# Patient Record
Sex: FEMALE | Race: WHITE
[De-identification: names, ages, dates, MRNs, and addresses within clinical notes are randomized per-mention and may not be internally consistent; named-entity substitution may affect disease eponyms.]

---

## 2017-02-03 ENCOUNTER — HOSPITAL ENCOUNTER (OUTPATIENT)
Dept: HOSPITAL 25 - OR | Age: 67
Discharge: HOME | End: 2017-02-03
Attending: ORTHOPAEDIC SURGERY
Payer: MEDICARE

## 2017-02-03 VITALS — DIASTOLIC BLOOD PRESSURE: 83 MMHG | SYSTOLIC BLOOD PRESSURE: 140 MMHG

## 2017-02-03 DIAGNOSIS — M75.42: Primary | ICD-10-CM

## 2017-02-03 DIAGNOSIS — M19.012: ICD-10-CM

## 2017-02-03 DIAGNOSIS — M75.102: ICD-10-CM

## 2017-02-03 DIAGNOSIS — M75.02: ICD-10-CM

## 2017-02-03 DIAGNOSIS — I10: ICD-10-CM

## 2017-02-03 RX ADMIN — FENTANYL CITRATE PRN MCG: 0.05 INJECTION, SOLUTION INTRAMUSCULAR; INTRAVENOUS at 11:28

## 2017-02-03 RX ADMIN — FENTANYL CITRATE PRN MCG: 0.05 INJECTION, SOLUTION INTRAMUSCULAR; INTRAVENOUS at 10:50

## 2017-02-05 NOTE — OP
OPERATIVE REPORT:

 

DATE OF OPERATION:  02/03/17

 

DATE OF BIRTH:  [birth year]

 

SURGEON:  Jono Conner MD

 

ASSISTANT:  MISSY Longo

 

ANESTHESIOLOGIST:  Dr. Kaba.

 

ANESTHESIA:  General anesthesia, regional anesthesia.

 

PRE-OP DIAGNOSES:

1.  Left shoulder partial thickness rotator cuff tear, supraspinatus.

2.  Left shoulder possible biceps, possible superior labral tear.

3.  Left shoulder subacromial impingement.

4.  Left shoulder AC joint arthritis.

5.  Left shoulder capsulitis, stiffness/reduced range of motion.

6. Left shoulder os acromiale

 

POST-OP DIAGNOSES:

1.  Left shoulder partial thickness rotator cuff tear, supraspinatus.

2.  Left shoulder subacromial impingement.

3.  Left shoulder AC joint arthritis.

4.  Left shoulder capsulitis, reduced range of motion.

5.  No left shoulder biceps tear.

6.  Positive left shoulder unstable superior labral tear.

7.  Left shoulder os acromiale

 

OPERATIVE PROCEDURE:

1.  Left shoulder examination under anesthesia.

2.  Left shoulder manipulation under anesthesia.

3.  Left shoulder arthroscopic debridement, extensive, including of the 
superior labrum, rotator cuff interval, and subacromial bursa.

4.  Left shoulder arthroscopic lysis of adhesions, rotator interval.

5.  Left shoulder arthroscopic release of biceps tendon

6.  Left shoulder arthroscopic subacromial decompression.

7.  Left shoulder arthroscopic distal clavicle resection.

 

ANTIBIOSIS:  2 g Ancef IV.

 

ESTIMATED BLOOD LOSS:  Minimal.

 

COMPLICATIONS:  None.

 

SPECIMEN:  None.

 

IMPLANTS:  None.

 

INDICATIONS FOR SURGERY:  The patient is a 66-year-old woman, right hand 
dominant, golfer and former , and currently a coordinator of a 
food pantry in [place], who presented to me in clinic with pain in the left 
shoulder from September 2016.  It affected her activities of daily living as 
well as sleep.  The patient was treated, insufficiently, with 12 full weeks of 
physical therapy, NSAIDs including Aleve, ibuprofen, and Celebrex as well as 
Tylenol, and a subacromial cortisone injection.  On the patient's exam, she 
consistently had subacromial impingement signs, pain and/or weakness with 
rotator cuff stress testing.  The patient sometimes had tenderness to palpation 
of the proximal biceps.  The patient had more recent clinic visits and 
demonstrated decreased forward flexion even after I had allowed some time in 
clinic to try to enhance this passive range of motion slowly and softly.  X-
rays demonstrated an os acromiale as well as AC joint, significant narrowing 
and increased anterior hook of the acromion.  MRI demonstrated partial 
thickness articular-sided rotator cuff tear of the anterior most supraspinatus 
approximately 50%, chronic, with no increased signal of that tear.  It also 
showed a [place] complex and os acromiale.  The patient opted for surgical 
management.

 

DESCRIPTION OF PROCEDURE:  Preoperative written consent.  Operative extremity 
was marked in the preoperative holding.  The patient opted for biceps release 
preoperatively as the biceps needed to be treated likewise as the superior 
labrum needed to be treated.  The patient received a regional anesthetic block 
in the preoperative holding.  The patient was taken back to the operating room 
and placed in a lateral decubitus position.  Dr. Kaba intubated the patient 
with an LMA in that lateral decubitus position.  All bony prominences were 
padded.  Axillary roll was placed.  Bean bag was inflated.  An examination 
under anesthesia was done.  I did not know, based on her preoperative exam, 
whether the patient would have restricted range of motion while under 
anesthesia.  As it turned out, examination under anesthesia demonstrated 
continued limited forward flexion to approximately 120 degrees with limited 
rotation as well.  I performed a manipulation under anesthesia, carefully, with 
my hands placed close to the glenohumeral joint.  Palpable and audible popping 
were appreciated as I improved the patient's range of motion with manipulation 
to 180 degrees of forward flexion, 90 degrees of external rotation, and 80 
degrees of internal rotation with a shoulder in an abducted position.  The left 
shoulder was placed in 10 pounds of traction.  The left shoulder was prepped 
and draped.  Surgical time-out was performed.  After the patient was fully 
prepped and draped, a formal time- out was performed.  20 cc of normal saline 
were injected from posterior into the left glenohumeral joint.  A posterior 
glenohumeral joint portal was then established using standard technique.  
Diagnostic arthroscopy was commenced.  There was some blood in the glenohumeral 
joint as is typically seen after manipulation under anesthesia.  An anterior 
glenohumeral joint portal was established under direct visualization.  I then 
did a thorough look around the glenohumeral joint. No significant cartilage 
lesion of the glenoid or humeral head were visible.  I viewed inferiorly, where 
the capsule had been overly tight and where it clearly been pulled off of the 
labrum with the manipulation under anesthesia.  Looking at the glenoid and 
labrum, there did appear to be Ger[place] complex.  There was a superior labral 
tear.  I debrided the edges of it with an arthroscopic shaver placed 
anteriorly.  This allowed me to better visualize the superior labral tear. With 
an arthroscopic probe, I doubt that it elevated more than 5 mm and was thus 
unstable.  The biceps tendon itself was not damaged.  Given the superior labral 
tear, the decision was made to cut the biceps.  I entered arthroscopic scissors 
from anterior and cut the biceps tendon at its origin.  I debrided the superior 
labrum to a stable base.  I then debrided some very thickened tissue in the 
rotator interval, likely responsible for some of the patient's restriction of 
motion preoperatively.  This allowed me to well visualize the subscapularis, 
which was fully intact.  I next looked at the undersurface of the supra and 
infraspinatus. Interestingly, along the anterior edge of the supraspinatus, 
there was no clear rotator cuff tearing.  There was no visible footprint.  
However, there were some bony changes about the posterior supraspinatus and the 
superior infraspinatus footprints.  There was no clear torn rotator cuff tendon 
tissue; however, there appeared to be either some cystic lesion or some 
uncovered footprint.  However, if these were uncovered footprints, I would say 
it was less than 5 mm.  It was clearly chronic and clearly not the source of 
the patient's pain.  Shows an area where that exposed footprint was largest and 
placed a spinal needle to elana that spot to evaluate it from the superior side 
from the subacromial space.  All instruments and fluid were removed from the 
glenohumeral joint.  I then went into the subacromial space from posterior and 
anterior.  I established a lateral subacromial portal under direct 
visualization.  In the subacromial space, I encountered tremendous amount of 
bursitis tissue.  I used an arthroscopic shaver to debride this significant 
amount of bursitis tissue.  I was able to well visualize the rotator cuff.  
There was no clear rotator cuff tear.  About the location of the spinal needle, 
I probed with an arthroscopic probe.  I was quite aggressive and still was not 
able to get all the way through the rotator cuff tendon, demonstrating that if 
there were rotator cuff tendon tear, it was low-grade partial thickness.  I 
turned my attention to the acromion and debrided approximately 6 mm of the 
inferior aspect of the anterior hook of the acromion.  I was wary of taking too 
much bone given the presence of an os acromiale.  I next addressed the distal 
clavicle and debrided 8 mm of the distal clavicle.  Instruments and fluid were 
removed from the subacromial space.  The skin incisions were closed with figure-
of-8 stitches using nylon 4-0 suture.  Xeroform, 4x4s, ABD dressings, foam tape
, sling, UltraSling was placed. The patient was awakened.  LMA was taken out 
and the patient was transferred to the PACU.

 

DISPOSITION:  The patient will receive Percocet for pain control, aspirin for 
DVT prophylaxis, and Keflex for antibiotic infection prophylaxis 
postoperatively.  She can remove her sling when she is comfortable doing so, 
wean out of it immediately. I will have her do physical therapy for 
strengthening and range of motion starting 3 days after surgery, and I will see 
her in the office 10 to 14 days postoperatively.

 

 [id number] #: [id number]

MTDD

## 2017-04-02 ENCOUNTER — HOSPITAL ENCOUNTER (EMERGENCY)
Dept: HOSPITAL 25 - ED | Age: 67
Discharge: HOME | End: 2017-04-02
Payer: MEDICARE

## 2017-04-02 VITALS — SYSTOLIC BLOOD PRESSURE: 144 MMHG | DIASTOLIC BLOOD PRESSURE: 84 MMHG

## 2017-04-02 DIAGNOSIS — R10.13: ICD-10-CM

## 2017-04-02 DIAGNOSIS — R11.2: ICD-10-CM

## 2017-04-02 DIAGNOSIS — K21.9: Primary | ICD-10-CM

## 2017-04-02 PROCEDURE — 99283 EMERGENCY DEPT VISIT LOW MDM: CPT

## 2017-04-02 NOTE — ED
I, Oh,Abdirashid, scribed for Theo Blackwell MD on 04/02/17 at 2026 .





Abdominal Pain/Female





- HPI Summary


HPI Summary: 





This 65 y/o female presents to ED for acute, burning epigastric pain radiating 

up to throat since 0100 AM this morning. She stopped taking her GERD medication 

since 5 days ago. Swallowing makes the pain worse. Blood was noted in n/v. 

Lying down makes the throat and epigastric pain worse. Pt was able to consume 

banana, but otherwise had trouble tolerating oral intake. Pt has been 

controlling her heartburn with chewable TUMS all day today. She took prevacid 

at 1400 PM this afternoon. PMHx includes known GERD, HTN, and spastic 

esophagitis. 





Plan of care involving maalox and GI cocktail is discussed. Pt is currently 

refusing any cardiac workup. 





- History of Current Complaint


Chief Complaint: EDGeneral


Stated Complaint: SEVER HEARTBURN X12 HRS


Time Seen by Provider: 04/02/17 20:11


Hx Obtained From: Patient, Medical Records


Onset/Duration: Still Present


Timing: Constant


Pain Intensity: 8


Pain Scale Used: 0-10 Numeric


Location: Epigastric


Radiates: Yes


Radiates to: Other - throat


Character: Sharp, Burning


Aggravating Factor(s): Food, Other: - lying down


Alleviating Factor(s): Antacids


Associated Signs and Symptoms: Positive: Nausea, Vomiting.  Negative: Fever


Allergies/Adverse Reactions: 


 Allergies











Allergy/AdvReac Type Severity Reaction Status Date / Time


 


No Known Allergies Allergy   Verified 04/02/17 19:41














PMH/Surg Hx/FS Hx/Imm Hx


Endocrine/Hematology History: 


   Denies: Hx Diabetes


Cardiovascular History: Reports: Hx Hypertension - ON DAILY MEDS, STATES WELL 

CONTROLLED


   Denies: Hx Pacemaker/ICD


GI History: Reports: Hx Gastroesophageal Reflux Disease - ", Hx Hiatal Hernia


 History: 


   Denies: Hx Renal Disease


Musculoskeletal History: Reports: Hx Arthritis - HANDS, Hx Tendonitis - ? LEFT 

SHOULDER


Sensory History: Reports: Hx Cataracts - BILATERAL 2007, Hx Contacts or Glasses 

- GLASSES


   Denies: Hx Hearing Aid


Opthamlomology History: Reports: Hx Cataracts - BILATERAL 2007, Hx Contacts or 

Glasses - GLASSES


Neurological History: Reports: Hx Migraine - Hx OF NONE IN YEARS, THEN 1 ON 1/15

/17, NO MEDS


Psychiatric History: 


   Denies: Hx Panic Disorder





- Cancer History


Hx Chemotherapy: No


Hx Radiation Therapy: No





- Surgical History


Surgery Procedure, Year, and Place: 2007 cataract surgery-BILATERAL  [facility].  

2000s LEFT ABD. fatty tumor removed  DR OFFICE.  2008 hysterectomy (partial) 

[facility].  pterygium removal


Hx Anesthesia Reactions: Yes - 2008 POT OP NIGHT BAD HA, N/V


Infectious Disease History: Yes


Infectious Disease History: Reports: Hx Clostridium Difficile


   Denies: Traveled Outside the [place] in Last 30 Days





- Family History


Known Family History: 


   Negative: Other - breast CA





- Social History


Alcohol Use: Occasionally


Alcohol Amount: 1-2  DRINKS/MONTH


Hx Substance Use: No


Substance Use Type: Reports: None


Hx Tobacco Use: No


Smoking Status (MU): Never Smoked Tobacco


Have You Smoked in the Last Year: No





Review of Systems


Negative: Fever


Positive: Abdominal Pain - epigastric pain, Vomiting, Nausea


Negative: dysuria


Negative: Anxious, Depressed


All Other Systems Reviewed And Are Negative: Yes





Physical Exam


Triage Information Reviewed: Yes


Vital Signs On Initial Exam: 


 Initial Vitals











Temp Pulse Resp BP Pulse Ox


 


 96.8 F   80   14   156/81   100 


 


 04/02/17 19:36  04/02/17 19:36  04/02/17 19:36  04/02/17 19:36  04/02/17 19:36











Vital Signs Reviewed: Yes


Appearance: Positive: Well-Appearing, No Pain Distress


Skin: Positive: Warm, Skin Color Reflects Adequate Perfusion, Dry


Head/Face: Positive: Normal Head/Face Inspection


Eyes: Positive: EOMI, KG


Neck: Positive: Supple, Nontender


Abdomen Description: Positive: Other: - epigastric tenderness


Musculoskeletal: Positive: Strength/ROM Intact


Neurological: Positive: Sensory/Motor Intact, Alert, Oriented to Person Place, 

Time


Psychiatric: Positive: Affect/Mood Appropriate


AVPU Assessment: Alert





Diagnostics





- Vital Signs


 Vital Signs











  Temp Pulse Resp BP Pulse Ox


 


 04/02/17 19:36  96.8 F  80  14  156/81  100














- Laboratory


Lab Statement: Any lab studies that have been ordered have been reviewed, and 

results considered in the medical decision making process.





Abdominal Pain Fem Course/Dx





- Course


Course Of Treatment: DISCUSSED LABS/EKG/CHEST PAIN WORK UP WITH PATIENT. SHE 

BELEIVES THIS IS GERD AND DECLINES WORK UP. PAIN IMPROVED WITH GI COCKTAIL AND 

PEPCID PO. DISCHARGE HOME STABLE.





- Diagnoses


Provider Diagnoses: 


 GERD (gastroesophageal reflux disease)








Discharge





- Discharge Plan


Condition: Stable


Disposition: HOME


Prescriptions: 


Lansoprazole CAP (NF) [Prevacid CAP (NF)] 30 mg PO DAILY #30 cap.


Sucralfate TAB* [Carafate*] 1 gm PO QID #40 tab


Patient Education Materials:  Gastroesophageal Reflux Disease (ED)


Referrals: 


Dary Del Cid MD [Primary Care Provider] - 


Additional Instructions: 


FOLLOW UP WITH YOUR DOCTOR.


RETURN TO THE EMERGENCY DEPARTMENT FOR ANY WORSENING OF YOUR CONDITION OR 

QUESTIONS OR CONCERNS.





The documentation as recorded by the Satya francis Soohyun accurately reflects the 

service I personally performed and the decisions made by me, Theo Blackwell MD.

## 2020-01-10 ENCOUNTER — HOSPITAL ENCOUNTER (OUTPATIENT)
Dept: HOSPITAL 25 - ED | Age: 70
Setting detail: OBSERVATION
LOS: 1 days | Discharge: HOME | End: 2020-01-11
Attending: INTERNAL MEDICINE | Admitting: HOSPITALIST
Payer: MEDICARE

## 2020-01-10 DIAGNOSIS — Z79.899: ICD-10-CM

## 2020-01-10 DIAGNOSIS — R53.1: ICD-10-CM

## 2020-01-10 DIAGNOSIS — I10: ICD-10-CM

## 2020-01-10 DIAGNOSIS — R07.9: Primary | ICD-10-CM

## 2020-01-10 DIAGNOSIS — R06.02: ICD-10-CM

## 2020-01-10 DIAGNOSIS — Z82.49: ICD-10-CM

## 2020-01-10 DIAGNOSIS — K21.9: ICD-10-CM

## 2020-01-10 DIAGNOSIS — Z79.82: ICD-10-CM

## 2020-01-10 DIAGNOSIS — R00.2: ICD-10-CM

## 2020-01-10 DIAGNOSIS — M19.90: ICD-10-CM

## 2020-01-10 LAB
ALBUMIN SERPL BCG-MCNC: 4.2 G/DL (ref 3.2–5.2)
ALBUMIN/GLOB SERPL: 1.5 {RATIO} (ref 1–3)
ALP SERPL-CCNC: 77 U/L (ref 34–104)
ALT SERPL W P-5'-P-CCNC: 20 U/L (ref 7–52)
ANION GAP SERPL CALC-SCNC: 8 MMOL/L (ref 2–11)
AST SERPL-CCNC: 17 U/L (ref 13–39)
BASOPHILS # BLD AUTO: 0 10^3/UL (ref 0–0.2)
BUN SERPL-MCNC: 17 MG/DL (ref 6–24)
BUN/CREAT SERPL: 21.8 (ref 8–20)
CALCIUM SERPL-MCNC: 9.3 MG/DL (ref 8.6–10.3)
CHLORIDE SERPL-SCNC: 104 MMOL/L (ref 101–111)
EOSINOPHIL # BLD AUTO: 0.1 10^3/UL (ref 0–0.6)
GLOBULIN SER CALC-MCNC: 2.8 G/DL (ref 2–4)
GLUCOSE SERPL-MCNC: 98 MG/DL (ref 70–100)
HCO3 SERPL-SCNC: 26 MMOL/L (ref 22–32)
HCT VFR BLD AUTO: 41 % (ref 35–47)
HGB BLD-MCNC: 14.6 G/DL (ref 12–16)
INR PPP/BLD: 0.97 (ref 0.82–1.09)
LYMPHOCYTES # BLD AUTO: 2.9 10^3/UL (ref 1–4.8)
MCH RBC QN AUTO: 30 PG (ref 27–31)
MCHC RBC AUTO-ENTMCNC: 35 G/DL (ref 31–36)
MCV RBC AUTO: 86 FL (ref 80–97)
MONOCYTES # BLD AUTO: 0.5 10^3/UL (ref 0–0.8)
NEUTROPHILS # BLD AUTO: 4.5 10^3/UL (ref 1.5–7.7)
NRBC # BLD AUTO: 0 10^3/UL
NRBC BLD QL AUTO: 0.2
PLATELET # BLD AUTO: 281 10^3/UL (ref 150–450)
POTASSIUM SERPL-SCNC: 4.4 MMOL/L (ref 3.5–5)
PROT SERPL-MCNC: 7 G/DL (ref 6.4–8.9)
RBC # BLD AUTO: 4.79 10^6 /UL (ref 3.7–4.87)
SODIUM SERPL-SCNC: 138 MMOL/L (ref 135–145)
TROPONIN I SERPL-MCNC: 0 NG/ML (ref ?–0.03)
WBC # BLD AUTO: 7.9 10^3/UL (ref 3.5–10.8)

## 2020-01-10 PROCEDURE — 96372 THER/PROPH/DIAG INJ SC/IM: CPT

## 2020-01-10 PROCEDURE — 85610 PROTHROMBIN TIME: CPT

## 2020-01-10 PROCEDURE — 93005 ELECTROCARDIOGRAM TRACING: CPT

## 2020-01-10 PROCEDURE — 80053 COMPREHEN METABOLIC PANEL: CPT

## 2020-01-10 PROCEDURE — 99284 EMERGENCY DEPT VISIT MOD MDM: CPT

## 2020-01-10 PROCEDURE — 36415 COLL VENOUS BLD VENIPUNCTURE: CPT

## 2020-01-10 PROCEDURE — 71046 X-RAY EXAM CHEST 2 VIEWS: CPT

## 2020-01-10 PROCEDURE — 84484 ASSAY OF TROPONIN QUANT: CPT

## 2020-01-10 PROCEDURE — G0378 HOSPITAL OBSERVATION PER HR: HCPCS

## 2020-01-10 PROCEDURE — 83036 HEMOGLOBIN GLYCOSYLATED A1C: CPT

## 2020-01-10 PROCEDURE — 85025 COMPLETE CBC W/AUTO DIFF WBC: CPT

## 2020-01-10 PROCEDURE — 93306 TTE W/DOPPLER COMPLETE: CPT

## 2020-01-10 PROCEDURE — 80061 LIPID PANEL: CPT

## 2020-01-10 PROCEDURE — 83880 ASSAY OF NATRIURETIC PEPTIDE: CPT

## 2020-01-10 RX ADMIN — ACETAMINOPHEN PRN MG: 325 TABLET ORAL at 23:37

## 2020-01-10 NOTE — ECHO
*[facility]*

[place] [address]

Phone: [phone]

Fax #: [phone]



-------------------------------------------------------------------

Transthoracic Echocardiogram



Patient: Sarah Godfrey                       MRN:        [id number]

:     1950                         Study Date: 01/10/2020

Age:     69                                 Accession#: [id number]

Gender:  F                                  HR:         74 bpm

Height:  62 in /157.5 cm                    BSA:        1.77 m^2

Weight:  166.7 lb /75.8 kg                  BMI:        30.5 kg/m^2



*Sonographer: * Marina Meneses RD

 

*Referring Physician: * Brittney Greene

*Reading Physician: * Joshua Loaiza MD



-------------------------------------------------------------------

Indications:   Chest Pain, unspecified.  SOB.



-------------------------------------------------------------------

History:   Risk factors:  Hypertension. Hiatal hernia.



-------------------------------------------------------------------

Conclusions



Summary:



- Left ventricle: The cavity size is normal. Wall thickness is

  mildly increased. Systolic function is normal. The estimated

  ejection fraction is 60-65%. Wall motion is normal; there are no

  regional wall motion abnormalities.

- Right ventricle: The cavity size is mildly dilated. Systolic

  function is mildly reduced.

- Left atrium: The atrium is mildly dilated.

- Mitral valve: There is mild to moderate regurgitation.

- Pulmonary arteries: Systolic pressure is within the normal range.



Recommendations:  None prior for comparison.



-------------------------------------------------------------------

Study data:  Transthoracic echocardiogram.  Procedure:

Transthoracic echocardiography was performed. Image quality was

fair.  Complete 2D, spectral Doppler, and color flow Doppler.

Location:  Emergency department.  Patient status:  Inpatient.

Patient room number: ED-03.  Rhythm:  Normal sinus rhythm with

PAC&apos;s.



-------------------------------------------------------------------

Findings



Left ventricle:  The cavity size is normal. Wall thickness is

mildly increased. Systolic function is normal. The estimated

ejection fraction is 60-65%. Wall motion is normal; there are no

regional wall motion abnormalities. Doppler parameters are

consistent with abnormal left ventricular relaxation (grade 1

diastolic dysfunction).

Right ventricle:  The cavity size is mildly dilated. Systolic

function is mildly reduced.

Left atrium:  The atrium is mildly dilated.

Right atrium:  The atrium is normal in size.

Mitral valve:  The leaflets are mildly thickened.  There is no

evidence of stenosis.   There is mild to moderate regurgitation.

Aortic valve:   The valve is trileaflet. The leaflets are mildly

thickened.  There is no evidence of stenosis.   There is trace

regurgitation.

Tricuspid valve:  The leaflets are normal thickness.  There is no

evidence of stenosis.   There is mild regurgitation.

Pulmonic valve:   The leaflets are normal thickness.  There is no

evidence of stenosis.   There is trace regurgitation.

Aorta:  Aortic root: The aortic root is appears normal.

Ascending aorta: The ascending aorta is appears normal.

Aortic arch: The aortic arch is appears normal.

Pericardium:  There is no significant pericardial effusion.

Pulmonary arteries:

Not well visualized. Systolic pressure is within the normal range,

tricuspid regurgitation velocity 2.7 m/s

Systemic veins:

Inferior vena cava: The vessel is dilated. There is (&gt;= 50%)

respiratory change in the IVC dimension.



-------------------------------------------------------------------

Measurements



 Left ventricle            Value        Ref         Aortic valve              Value       Ref

 MILADY, LAX                  4.2   cm     3.8 - 5.2   Christie diam, ED              1.7   cm    -----

 ESD, LAX                  3.0   cm     2.2 - 3.5   Peak v, S                 1.33  m/sec -----

 FS, LAX                   30    %      27 - 45     VTI, S                    26.0  cm    -----

 PW, ED, LAX       (H)     1.1   cm     0.6 - 0.9   Mean grad, S              3.0   mm Hg -----

 FS                        30    %      27 - 45     Peak grad, S              7.0   mm Hg -----

 PW, ED            (H)     1.1   cm     0.6 - 0.9   LVOT/AV, VTI ratio        0.77        -----

 E&apos;, lat christie, TDI  (L)     8.7   cm/sec &gt;=10.0

 E/e&apos;, lat christie,            9            ----------  Mitral valve              Value       Ref

 TDI                                                Peak E                    0.77  m/sec -----

 E&apos;, med christie, TDI          7.7   cm/sec &gt;=7.0       Peak A                    1.15  m/sec ---
--

 E/e&apos;, med christie,            10           ----------  Decel time                229   ms    -----

 TDI                                                Peak grad, D              2.3   mm Hg -----

 E&apos;, avg, TDI              8.2   cm/sec ----------  Peak E/A ratio            0.7         -----

 E/e&apos;, avg, TDI            9            &lt;=14

                                                    Pulmonic valve            Value       Ref

 LVOT                      Value        Ref         Peak v, S                 1.27  m/sec -----

 Peak matheus, S               0.97  m/sec  ----------  Peak grad, S              6.0   mm Hg -----

 VTI, S                    20.0  cm     ----------

 Mean grad, S              3     mm Hg  ----------  Tricuspid valve           Value       Ref

                                                    TR peak v                 2.7   m/sec &lt;=2.8

 Ventricular septum        Value        Ref         Peak RV-RA grad, S        29    mm Hg -----

 IVS, ED           (H)     1.1   cm     0.6 - 0.9

                                                    Aortic root               Value       Ref

 Right ventricle           Value        Ref         Root diam                 2.9   cm    &lt;4.0

 MILADY, LAX                  3.5   cm     ----------

 MILADY minor ax, A4C (H)     4.1   cm     1.9 - 3.5   Ascending aorta           Value       Ref

 mid                                                AAo AP diam, S            3.1   cm    -----

 Pressure, S               37    mm Hg  ----------

                                                    Aortic arch               Value       Ref

 Left atrium               Value        Ref         Arch diam                 2.4   cm    -----

 AP dim, ES        (H)     3.90  cm     2.70 -

                                        3.80        Decending aorta           Value       Ref

 ML dim, A4C               4.0   cm     ----------  [place] peak matheus              0.79  m/sec -----

 SI dim, A4C               5.2   cm     ----------

 Vol/bsa, ES, 1-p          26    ml/m^2 11 - 40     Pulmonary artery          Value       Ref

 A4C                                                Pressure, S               31.0  mm Hg -----

 Vol/bsa, ES, A/L          32    ml/m^2 16 - 34

                                                    Inferior vena cava        Value       Ref

 Right atrium              Value        Ref         Diam                      2.2   cm    -----

 SI dim, ES                4.6   cm     3.4 - 5.3

 ML dim, ES, A4C           3.4   cm     2.6 - 4.4

 Estimated RAP             8     mm Hg  ----------

 

Legend:

(L)  and  (H)  elana values outside specified reference range.



Prepared and electronically signed by



Joshua Loaiza MD

01/10/2020 17:38

## 2020-01-10 NOTE — ED
HPI Chest Pain





- HPI Summary


HPI Summary: 





Pt is a 68 y/o F presenting to the ED with a chief complaint of chest pain 

initially onset a while ago. Pt states it has been worsening for about 1 week 

now, and describes episodes of SOB, weakness, and slight edema of her feet. She 

called her doctor this morning who did an EKG and recommended she come here d/t 

EKG changes. Denies cardiac hx, hx PE/DVT, CHF.  Fam hx of cardiac problems.





- History of Current Complaint


Chief Complaint: EDChestPainROMI


Time Seen by Provider: 01/10/20 13:12


Hx Obtained From: Patient


Onset/Duration: Started Days Ago, Still Present


Timing: Intermittent, Lasting Hours


Initial Severity: Mild


Current Severity: Mild


Pain Intensity: 2


Pain Scale Used: 0-10 Numeric


Chest Pain Location: Diffuse


Chest Pain Radiates: No


Character: Pressure/Squeezing


Aggravating Factor(s): Nothing


Alleviating Factor(s): Nothing


Associated Signs and Symptoms: Positive: Chest Pain, Weakness, Shortness of 

Breath, Palpitations, Edema





- Allergy/Home Medications


Allergies/Adverse Reactions: 


 Allergies











Allergy/AdvReac Type Severity Reaction Status Date / Time


 


No Known Allergies Allergy   Verified 01/10/20 13:44











Home Medications: 


 Home Medications





Aspirin EC TAB* [Ecotrin EC Low Dose 81 MG*] 81 mg PO DAILY 01/10/20 [History 

Confirmed 01/10/20]


Losartan/Hydrochlorothiazide [Losartan-Hctz 50-12.5 mg Tab] 1 each PO DAILY 01/

10/20 [History Confirmed 01/10/20]











PMH/Surg Hx/FS Hx/Imm Hx


Previously Healthy: Yes


Endocrine/Hematology History: 


   Denies: Hx Diabetes


Cardiovascular History: Reports: Hx Hypertension - ON DAILY MEDS, STATES WELL 

CONTROLLED


   Denies: Hx Pacemaker/ICD


GI History: Reports: Hx Gastroesophageal Reflux Disease - ", Hx Hiatal Hernia


 History: 


   Denies: Hx Renal Disease


Musculoskeletal History: Reports: Hx Arthritis - HANDS, Hx Tendonitis - ? LEFT 

SHOULDER


Sensory History: Reports: Hx Cataracts - BILATERAL 2007, Hx Contacts or Glasses 

- GLASSES


   Denies: Hx Hearing Aid


Opthamlomology History: Reports: Hx Cataracts - BILATERAL 2007, Hx Contacts or 

Glasses - GLASSES


Neurological History: Reports: Hx Migraine - Hx OF NONE IN YEARS, THEN 1 ON 1/15

/17, NO MEDS


Psychiatric History: 


   Denies: Hx Panic Disorder





- Cancer History


Hx Chemotherapy: No


Hx Radiation Therapy: No





- Surgical History


Surgery Procedure, Year, and Place: 2007 cataract surgery-BILATERAL  [facility].  

2000s LEFT ABD. fatty tumor removed  DR OFFICE.  2008 hysterectomy (partial) 

[facility].  pterygium removal


Hx Anesthesia Reactions: Yes - 2008 POT OP NIGHT BAD HA, N/V


Infectious Disease History: No


Infectious Disease History: Reports: Hx Clostridium Difficile


   Denies: Traveled Outside the [place] in Last 30 Days





- Family History


Known Family History: 


   Negative: Other - breast CA





- Social History


Alcohol Use: Occasionally


Alcohol Amount: 1-2  DRINKS/MONTH


Hx Substance Use: No


Substance Use Type: Reports: None


Hx Tobacco Use: No


Smoking Status (MU): Never Smoked Tobacco


Have You Smoked in the Last Year: No





Review of Systems


Positive: Palpitations, Chest Pain


Positive: Shortness Of Breath


Positive: Edema


Positive: Weakness


All Other Systems Reviewed And Are Negative: Yes





Physical Exam





- Summary


Physical Exam Summary: 





Constitutional: Well-developed, Well-nourished, Alert. (-) Distressed


Skin: Warm, Dry


HENT: Normocephalic; Atraumatic


Eyes: Conjunctiva normal


Neck: Musculoskeletal ROM normal neck. (-) JVD, (-) Stridor, (-) Nuchal rigidity


Cardio: Rhythm regular, rate normal, Heart sounds normal; Intact distal pulses; 

Radial pulses are 2+ and symmetric. (-) Murmur


Pulmonary/Chest wall: Effort normal. (-) Respiratory distress, (-) Wheezes, (-) 

Rales


Abd: Soft, (-) tenderness, (-) Distension, (-) Guarding, (-) Rebound


Musculoskeletal: (-) Edema


Lymph: (-) Cervical adenopathy


Neuro: Alert, Oriented x3


Psych: Mood and affect Normal





Triage Information Reviewed: Yes


Vital Signs On Initial Exam: 


 Initial Vitals











Temp Pulse Resp BP Pulse Ox


 


 97.1 F   69   16   176/87   100 


 


 01/10/20 12:44  01/10/20 12:44  01/10/20 12:44  01/10/20 12:44  01/10/20 12:44











Vital Signs Reviewed: Yes





Diagnostics





- Vital Signs


 Vital Signs











  Temp Pulse Resp BP Pulse Ox


 


 01/10/20 12:44  97.1 F  69  16  176/87  100














- Laboratory


Lab Results: 


 Lab Results











  01/10/20 01/10/20 Range/Units





  12:53 12:53 


 


WBC  7.9   (3.5-10.8)  10^3/uL


 


RBC  4.79   (3.70-4.87)  10^6 /uL


 


Hgb  14.6   (12.0-16.0)  g/dL


 


Hct  41   (35-47)  %


 


MCV  86   (80-97)  fL


 


MCH  30   (27-31)  pg


 


MCHC  35   (31-36)  g/dL


 


RDW  13   (10-15)  %


 


Plt Count  281   (150-450)  10^3/uL


 


MPV  7.7   (7.4-10.4)  fL


 


Neut % (Auto)  56.5   %


 


Lymph % (Auto)  36.6   %


 


Mono % (Auto)  5.8   %


 


Eos % (Auto)  0.7   %


 


Baso % (Auto)  0.4   %


 


Absolute Neuts (auto)  4.5   (1.5-7.7)  10^3/ul


 


Absolute Lymphs (auto)  2.9   (1.0-4.8)  10^3/ul


 


Absolute Monos (auto)  0.5   (0-0.8)  10^3/ul


 


Absolute Eos (auto)  0.1   (0-0.6)  10^3/ul


 


Absolute Basos (auto)  0.0   (0-0.2)  10^3/ul


 


Absolute Nucleated RBC  0.0   10^3/ul


 


Nucleated RBC %  0.2   


 


INR (Anticoag Therapy)   0.97  (0.82-1.09)  











Result Diagrams: 


 01/10/20 12:53





 01/10/20 12:53


Lab Statement: Any lab studies that have been ordered have been reviewed, and 

results considered in the medical decision making process.





- Radiology


  ** CXR


Radiology Interpretation Completed By: Radiologist


Summary of Radiographic Findings: Stigmata of probable obstructive lung 

disease. No acute pulmonary or cardiac process evident.  ED physician has 

reviewed this report.





- EKG


  ** 1244


Cardiac Rate: NL - 67bpm


EKG Rhythm: Sinus Rhythm


ST Segment: Normal


Ectopy: None


EKG Comparison: No Significant Change


Summary of EKG Findings: An EKG at 1244 reveals normal sinus rhythm 67bpm, nml 

axis, nml intervals. T-wave inversions in III, v3, v4. No STEMI. No acute 

changes. When compared to prior, 4/4/2016, no significant change is noted. ED 

physician has reviewed and interpreted this EKG.





Chest Pain Course/Dx





- Course


Course Of Treatment: 68 y/o F p/w LAWRENCE and chest tightness.  - EKG reviewed, no 

noted changes.  - labs notable for normal trop, BNP normal (does have trace 

edema LE).  - CXR normal.  Chest Pain DDX:  The patient is well appearing, with 

stable vitals.  Given the patient's clinical presentation, highest on 

differential is ACS. Heart score 5, will d/w hospital.  Although less likely, 

differential also includes the following:  --Pneumothorax: Equal breath sounds, 

story inconsistent since gradual onset of symptoms. CXR shows no evidence of 

pneumothorax. Unlikely.  --Cardiac tamponade: The history and physical are not 

concerning for tamponade. No Pulsus Paradoxus, no tachypnea. Unlikely.  --

Mediastinitis or esophageal rupture: The history is not consistent, as the 

patient has had no recent history of significant wretching, instrumentation, or 

mediastinal surgeries. Unlikely.  --Aortic dissection: The patient does not 

describe the classical tearing chest pain radiating into the back, and the CXR 

does not show mediastinal widening or other signs of aortic dissection. 

Unlikely.  --PE: Vitals wnl (not hypoxic, tachycardic or tachypneic).  --ACS: 

The initial EKG shows no ischemic changes. The initial troponin is not elevated.





- Diagnoses


Provider Diagnoses: 


 Chest pain, SOB (shortness of breath)








- Provider Notifications


Discussed Care Of Patient With: Marc Reagan


Time Discussed With Above Provider: 14:34


Instructed by Provider To: Admit As Inpatient





Discharge ED





- Sign-Out/Discharge


Documenting (check all that apply): Patient Departure





- Discharge Plan


Condition: Stable


Disposition: ADMITTED TO [facility]





- Billing Disposition and Condition


Condition: STABLE


Disposition: Admitted to [facility]





- Attestation Statements


Document Initiated by Scribe: Yes


Documenting Scribe: Aicha Gannon


Provider For Whom Danisha is Documenting (Include Credential): Cesar Simons MD.


Scribe Attestation: 


Aicha SAVAGE, scribed for Cesar Simons MD. on 01/10/20 at 2020. 


Scribe Documentation Reviewed: Yes


Provider Attestation: 


The documentation as recorded by the scribe, Aicha Gannon accurately 

reflects the service I personally performed and the decisions made by me, 

Cesar Simons MD.


Status of [place] Document: Viewed

## 2020-01-10 NOTE — HP
CC:  Dr. Natalie Miranda *

 

HISTORY AND PHYSICAL:

 

DATE OF ADMISSION:  01/10/20

 

PRIMARY CARE PROVIDER:  Dr. Natalie Miranda.

 

ATTENDING PHYSICIAN:  Dr. Marc Reagan * (dictated by MISSY Hauser).

 

CHIEF COMPLAINT:

1.  Shortness of breath.

2.  Chest pain.

 

HISTORY OF PRESENT ILLNESS:  Ms. Godfrey is a 69-year-old female with a past 
medical history of hypertension, GERD, esophageal spasms, who presented to the 
ER today with complaints of chest pain and shortness of breath.  She notes that 
she has had this for "years," but notes that it happens intermittently 
approximately once every 3 months.  She states that this has been occurring 
daily for the last 1 week.  She notes that her shortness of breath is worse at 
night when she is lying flat.  To alleviate this, she sleeps in a recliner, 
which she notes improves her shortness of breath and chest pain, but her chest 
tightness lasts for several hours afterwards.  She has associated chest 
tightness with activity such as cleaning the house.  She also has associated 
shortness of breath and dizziness. She denies diaphoresis.  Her chest pain is 
midsternal without radiation, it improves with rest.  She describes it as a 
tightness that she rates at 5-6/10.  She does have associated palpitations at 
times.  Incidentally, the patient notes that her left arm aches every morning 
and this goes away within 1 hour.  This left arm ache is not associated with 
chest pain and she believes it is positional because of the way she sleeps.  
The patient states that she has had associated bilateral lower extremity edema, 
which has since resolved.  She states that elevation of the extremities 
improves the edema.  The patient complains of headache and intermittent dry 
cough for the last couple of weeks.  The patient's HEART score calculated is 5, 
moderate score.

 

In the ER, the patient received a full workup including laboratory data.  CBC 
and CMP were unremarkable.  Troponin 0.00.  An EKG shows ST depression in leads 
V3 to V5, which are unchanged from EKG in April of 2016.  Chest x-ray shows 
probable obstructive lung disease.  The hospitalist team was asked to evaluate 
the patient for admission.

 

PAST MEDICAL HISTORY:

1.  Hypertension.

2.  GERD.

3.  Esophageal spasms.

4.  History of migraines.

5.  Osteoarthritis.

 

PAST SURGICAL HISTORY:  Bilateral cataracts, fatty tumor from abdomen, partial 
hysterectomy, pterygium, left shoulder.

 

HOME MEDICATIONS:

1.  Aspirin 81 mg p.o. daily.

2.  Cholecalciferol 1000 units p.o. daily.

3.  Conjugated estrogens vaginal cream 1 application vaginally 2 times per week.

4.  Cyanocobalamin 1000 mcg p.o. 4 times per week.

5.  Hydrochlorothiazide 12.5 mg p.o. 3 times per week.

6.  Hyoscyamine 0.125 mg p.o. daily p.r.n.

7.  Lansoprazole 30 mg p.o. daily.

8.  Losartan 50 mg p.o. daily.

 

ALLERGIES:  No known drug allergies.

 

FAMILY HISTORY:  The patient has 4 siblings who have had coronary artery 
disease or MI.  Mother had CVA, valve replacement.  Father had MI and diabetes.
  Paternal grandmother had an MI.  No family history of cancer.

 

SOCIAL HISTORY:  The patient denies current or former use of tobacco.  She 
drinks less than weekly.  She is a retired teacher at [employer], and she is 
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  She is 
.  She has 1 child.  She lives home alone.  In the event that she is 
unable to make her own medical decisions, she has appointed her brother Jimbo Riley to be her surrogate decision maker.

 

REVIEW OF SYSTEMS:  A 14-point review of systems has been performed and all the 
pertinent positives and negatives are in the HPI.  All other systems are 
negative.

 

                               PHYSICAL EXAMINATION

 

GENERAL:  Ms. Godfrey is a well-developed, well-nourished, slightly obese, older 
white woman, who is sitting up in bed.  She appears to be in no acute distress.
  She is pleasant and cooperative.  She is breathing comfortably on room air.

 

HEENT:  PERRL.  EOMI.  Visual fields grossly intact.  Nonicteric sclerae.  
Hearing is grossly intact.  Oral mucous membranes are moist.  There are no 
lesions.  The pharynx is clear.  The tongue is at midline.  Palate elevates 
symmetrically.

 

PULMONARY:  Symmetrical chest expansion without use of accessory muscles.  
Clear to auscultation bilaterally without rhonchi, wheeze, or rales.

 

CARDIOVASCULAR:  Regular rate and rhythm with S1 and S2 present without murmurs
, rubs, clicks, or gallops.  There is no JVD.  There is trace lower extremity 
edema.

 

ABDOMEN:  Bowel sounds in all quadrants.  Soft, nontender to palpation.

 

NEURO:  The patient is awake.  She is alert and oriented x3 with cranial nerves 
II through XII grossly intact.  She is able to move all of her extremities with 
a motor strength of 5/5 bilaterally in the upper and lower extremities.  She 
has an equal  strength.

 

 DIAGNOSTIC STUDIES/LAB DATA:  CBC unremarkable.  Chem panel unremarkable 
except for BUN/creatinine ratio of 21.8.  Troponin 0.00.

 

1.  EKG:  Rate 67 with mild ST depressions in V3 to V5, which are unchanged in 
comparison to April 2016 EKG.

2.  Chest x-ray, impression:  Stigmata of probable obstructive lung disease.  
No acute pulmonary or cardiac process evident.

 

ASSESSMENT AND PLAN:  Ms. Godfrey is a 69-year-old female with a past medical 
history of hypertension, gastroesophageal reflux disease, esophageal spasms, 
who presented to the ER today with complaints of dyspnea on exertion, 
paroxysmal nocturnal dyspnea, and exertional chest pain.  She will be admitted 
for:



1.  Chest pain, dyspnea on exertion.  The patient presents with exertional 
chest pain, dyspnea on exertion, and paroxysmal nocturnal dyspnea as well as 
lower extremity edema.  She states that these symptoms have been occurring for 
approximately 1 week.  In the ER, the patient has a troponin of 0.00 x1.  We 
will continue to trend the patient's troponin.  An EKG was obtained and shows 
ST depressions in V3 to V5, which are unchanged from her previous EKG in 2016.  
We will repeat an EKG in the morning.  The patient is currently on aspirin 81 
mg daily and we will continue this medication.  Risk stratification with 
hemoglobin A1c and lipid panel.  An echo has been ordered.  Discussion was had 
with the patient and recommendations are that the patient receive a stress test 
while she is in the hospital.  The patient is not interested in spending the 
weekend in the hospital, but would like to take the night to think about it.  
Meanwhile, I have scheduled a stress test for Monday morning inpatient and she 
will be n.p.o. after midnight on Sunday night.

2.  Hypertension.  Continue the patient's losartan 50 and HCTZ 12.5 three times 
per week.

3.  Gastroesophageal reflux disease.  Continue PPI.

4.  Esophageal spasms.  Hyoscyamine p.r.n.

5.  DVT prophylaxis:  According to DVT Risk Assessment, the patient scores 3, 
placing her at high risk.  She will be started on Lovenox.

6.  Code status:  Full code.

 

TIME SPENT:  Approximately 60 minutes was spent on this admission, greater than 
half that time was spent face-to-face with the patient obtaining history, 
performing physical, and reviewing the plan of care.

 

The case has been discussed with my attending Dr. Reagan, who is in agreement 
with the plan of care.

 

 ____________________________________ 

MISSY ROUSSEAU

 

[id number]/CPS #: [id number]

ADRIANA

## 2020-01-11 VITALS — SYSTOLIC BLOOD PRESSURE: 135 MMHG | DIASTOLIC BLOOD PRESSURE: 66 MMHG

## 2020-01-11 LAB
CHOLEST SERPL-MCNC: 219 MG/DL
HDLC SERPL-MCNC: 44.7 MG/DL
TRIGL SERPL-MCNC: 163 MG/DL

## 2020-01-11 RX ADMIN — ACETAMINOPHEN PRN MG: 325 TABLET ORAL at 10:46

## 2020-01-11 NOTE — PN
Subjective


Date of Service: 01/11/20


Interval History: 





Admitted yesterday for chronic SOB. Trop negative x 3. No events on tele. 

Pending stress on Monday. CXR with possible obstructive disease but pt has no h/

o tobacco use and is not wheezing or hypoxic. She feels well today and wants to 

go home. She understands the indication for stress test, and would prefer to do 

that outpatient with her PCP. 








Objective


Active Medications: 








Acetaminophen (Tylenol Tab*)  650 mg PO Q4H PRN


   PRN Reason: mild to moderate pain


   Last Admin: 01/10/20 23:37 Dose:  650 mg


Aspirin (Aspirin Ec Tab*)  81 mg PO DAILY [facility]


   Last Admin: 01/11/20 08:18 Dose:  81 mg


Cholecalciferol (Vitamin D Tab*)  1,000 units PO DAILY [facility]


   Last Admin: 01/11/20 08:18 Dose:  1,000 units


Cyanocobalamin (Vitamin B12 Tab*)  1,000 mcg PO SuTuThSa [facility]


   Last Admin: 01/11/20 08:19 Dose:  1,000 mcg


Enoxaparin Sodium (Lovenox(*))  40 mg SUBCUT BEDTIME [facility]


   Last Admin: 01/10/20 23:38 Dose:  40 mg


Estrogens Conjugated (Premarin Vag Cream*)  1 applic VAGINAL SuTh [facility]


Hydrochlorothiazide (Hydrodiuril Tab*)  12.5 mg PO MoWeFr [facility]


   Last Admin: 01/10/20 23:43 Dose:  Not Given


Losartan Potassium (Cozaar Tab*)  50 mg PO DAILY [facility]


   Last Admin: 01/11/20 08:18 Dose:  50 mg


Pantoprazole Sodium (Protonix Tab*)  40 mg PO QAM [facility]


   Last Admin: 01/11/20 08:19 Dose:  40 mg








 Vital Signs - 8 hr











  01/11/20 01/11/20





  03:05 07:19


 


Temperature 98.0 F 97.8 F


 


Pulse Rate 71 65


 


Respiratory 16 18





Rate  


 


Blood Pressure 114/63 125/67





(mmHg)  


 


O2 Sat by Pulse 97 97





Oximetry  











Oxygen Devices in Use Now: None


Appearance: well appearing woman in NAD, speaking in full sentences


Eyes: No Scleral Icterus


Ears/Nose/Mouth/Throat: Clear Oropharnyx, Mucous Membranes Moist


Neck: NL Appearance and Movements; NL JVP, Trachea Midline


Respiratory: Symmetrical Chest Expansion and Respiratory Effort, Clear to 

Auscultation


Cardiovascular: NL Sounds; No Murmurs; No JVD, RRR


Abdominal: NL Sounds; No Tenderness; No Distention, No Hepatosplenomegaly


Extremities: No Edema


Skin: No Rash or Ulcers


Neurological: Alert and Oriented x 3, NL Gait


Result Diagrams: 


 01/10/20 12:53





 01/10/20 12:53


Additional Lab and Data: 


 Lab Results











  01/10/20 01/10/20 Range/Units





  12:53 12:53 


 


WBC  7.9   (3.5-10.8)  10^3/uL


 


RBC  4.79   (3.70-4.87)  10^6 /uL


 


Hgb  14.6   (12.0-16.0)  g/dL


 


Hct  41   (35-47)  %


 


MCV  86   (80-97)  fL


 


MCH  30   (27-31)  pg


 


MCHC  35   (31-36)  g/dL


 


RDW  13   (10-15)  %


 


Plt Count  281   (150-450)  10^3/uL


 


MPV  7.7   (7.4-10.4)  fL


 


Neut % (Auto)  56.5   %


 


Lymph % (Auto)  36.6   %


 


Mono % (Auto)  5.8   %


 


Eos % (Auto)  0.7   %


 


Baso % (Auto)  0.4   %


 


Absolute Neuts (auto)  4.5   (1.5-7.7)  10^3/ul


 


Absolute Lymphs (auto)  2.9   (1.0-4.8)  10^3/ul


 


Absolute Monos (auto)  0.5   (0-0.8)  10^3/ul


 


Absolute Eos (auto)  0.1   (0-0.6)  10^3/ul


 


Absolute Basos (auto)  0.0   (0-0.2)  10^3/ul


 


Absolute Nucleated RBC  0.0   10^3/ul


 


Nucleated RBC %  0.2   


 


INR (Anticoag Therapy)   0.97  (0.82-1.09)  














Assess/Plan/Problems-Billing


Assessment: 





69W with HTN, GERD, presents with progressive, chronic SOB associated with 

orthopnea, LE edema, and worse on exertion. No EKG changes and troponin 

negative x 3. Pt reports history of fluid retention and is noted to have TTE 

with evidence for diastolic dysfunction. Discussed HFpEF and needing better BP 

control and a diuretic. Pt reports she skips many doses of HCTZ due to needing 

to pee at night. Discussed shorter acting diuretic and will try furosemide 

instead of HCTZ. Pt to follow up closely with PCP and bring log of daily 

weights and BP for titration.

## 2024-04-12 NOTE — DS
Anesthesia Pre Eval Note    Anesthesia ROS/Med Hx          Pulmonary Review:  Positive for pneumonia  Positive for COPD     Cardiovascular Review:     Positive for hypertension  Positive for hyperlipidemia  Additional Results:      ALLERGIES:  No Known Allergies   Last Labs        Component                Value               Date/Time                  WBC                      10.6                04/11/2024 0515            RBC                      4.45 (L)            04/11/2024 0515            HGB                      10.7 (L)            04/11/2024 0515            HCT                      36.9 (L)            04/11/2024 0515            MCV                      82.9                04/11/2024 0515            MCH                      24.0 (L)            04/11/2024 0515            MCHC                     29.0 (L)            04/11/2024 0515            RDW-CV                   15.9 (H)            04/11/2024 0515            Sodium                   137                 04/11/2024 0515            Potassium                3.9                 04/11/2024 0515            Chloride                 104                 04/11/2024 0515            Carbon Dioxide           30                  04/11/2024 0515            Glucose                  94                  04/11/2024 0515            BUN                      14                  04/11/2024 0515            Creatinine               0.72                04/11/2024 0515            Glomerular Filtrati*     >90                 04/11/2024 0515            Calcium                  8.2 (L)             04/11/2024 0515            PLT                      326                 04/11/2024 0515            PTT                      27                  04/08/2024 1640            PTT                      24                  05/20/2023 1235            INR                      1.2                 04/08/2024 1640        Past Medical History:  No date: Cervical stenosis of spine  No date: COPD (chronic  CC:  Dr. Natalie Miranda *

 

DISCHARGE SUMMARY:

 

DATE OF ADMISSION:  01/10/20

 

DATE OF DISCHARGE:  01/11/20

 

PRIMARY CARE PHYSICIAN:  Dr. Natalie Miranda.

 

PRIMARY DIAGNOSIS:  Chest pain possibly from new heart failure with preserved 
ejection fraction.

 

SECONDARY DIAGNOSIS:  Hypertension.

 

DISCHARGE MEDICATIONS:

1.  Losartan 50 mg at bedtime.

2.  Furosemide 20 mg daily.

3.  Aspirin 81 mg daily.

4.  Lansoprazole 30 mg in the morning.

5.  Vitamin D3 1000 units daily.

6.  Vitamin B12 1000 mcg 4 times a week.

 

HISTORY OF PRESENT ILLNESS:  

Ms. Godfrey is a 69-year-old woman with hypertension, GERD, esophageal spasm who 
was presenting with intermittent and chronic progressive chest pain and 
shortness of breath.  She states she has had these symptoms for years and they 
happened intermittently approximately once every 3 months; however, over the 
last week, her symptoms have occurred daily.  Her shortness of breath is worse 
at night when she is lying flat, and therefore, she has been sleeping in a 
recliner which improves her shortness of breath and chest pain.  These symptoms 
can last for several hours before relieved by sitting upright.  They are 
associated with chest tightness during exertion, although she states that her 
exercise tolerance to walking on flat ground is unlimited.  Also on exertion, 
she has shortness of breath with dizziness.  Her chest pain is midsternal 
without radiation and approximately 5 to 6 out of 10 in severity.  The patient 
reports that she has intermittent low extremity edema and takes a diuretic at 
home, but only intermittently, as she notes that she has to wake up in the 
middle of the night to pee frequently even if she takes the diuretic in the 
morning.  The patient denies fevers, chills, new productive cough, or malaise.

 

HOSPITAL COURSE:  

In the emergency room, the patient's labs were unremarkable.  Her troponin was 
negative x3, and EKG showed minimal ST depressions in V3 through V5 which were 
unchanged from EKG 4 years ago.  Her chest x-ray showed possible obstructive 
lung disease.  The hospitalist team was asked to evaluate the patient for 
admission and for cardiac stress testing.  The patient was admitted to 
telemetry overnight and had no cardiac events.  A total of 3 troponins were 
drawn and negative.  The patient underwent a transthoracic echocardiogram which 
was with normal systolic function, however, it did show grade 1 diastolic 
dysfunction associated with mildly increased LV wall thickness and LA mildly 
dilated.  It was discussed with the patient that she has mild heart failure and 
that she should be on a diuretic every day as this could help her symptoms of 
lower extremity edema and orthopnea.  The patient was reluctant to try diuretic 
given that hydrochlorothiazide continues to increase her urine output even 
while she is asleep, but she was educated that Lasix is much shorter acting and 
she would urinate mostly in the morning.  She was amenable to switching to this 
medications. She was also educated to continue measuring her weight daily as 
well as her blood pressure for ongoing titration of her hypertension and heart 
failure medications. She states that she has a followup appointment with her 
primary care physician in 2 weeks, but she would be advised to move this 
forward.  The patient prefers to follow up the need for stress test with her 
outpatient provider.

 

PERTINENT STUDIES:  

CBC, BMP, LFTs, BNP unremarkable.  



Troponins negative x3.



Hemoglobin A1c was 4.8. 



, HDL 45, triglycerides 163, total cholesterol 209.

 

Chest x-ray with elevated lung volumes, unchanged mild elevation of the right 
hemidiaphragm.  No focal pulmonary lesion, compelling alveolar consolidation, 
pleural effusion, pneumothorax.  The heart pulmonary vasculature and 
mediastinal contours are unremarkable.  Mild thoracic degenerative spondylosis.

 

Transthoracic echocardiogram with LV cavity size normal, wall thickness mildly 
increased, systolic function normal with EF 60% to 65%, wall motion normal.  No 
regional wall motion abnormalities.  RV cavity size mildly dilated with 
systolic function mildly reduced.  LA mildly dilated.  Mitral valve with mild 
to moderate regurgitation.  Pulmonary artery systolic pressure within the 
normal range. Doppler parameters are consistent with abnormal left ventricular 
relaxation (grade 1 diastolic dysfunction).

 

EKG with normal sinus rhythm, rate 67.  Minimal ST depression in anterior 
leads.  T- wave inversion in III with T wave flattening in aVF and anteriorly.  
This is largely unchanged from EKG in April 2016.

 

DISCHARGE PLAN:  

The patient will be discharged home to follow up with her primary care 
physician for ongoing cardiac risk stratification and optimization.  Her only 
change to her home medication was the discontinuation of hydrochlorothiazide, 
and as such, she was encouraged to take furosemide 20 mg every day.

 

The patient was educated to measure her blood pressure and weight every day and 
to bring this log to her primary care physician for optimization of her blood 
pressure and volume status.

 

The patient was given return precautions which include, but are not limited to 
recurrence of chest pain or worsening shortness of breath.

 

She should eat a healthy diet, low in processed foods and resume activity as 
tolerated.

 

DISPOSITION:  Home.

 

CONDITION:  Good.

 

TIME SPENT:  Approximately 60 minutes was spent on discharge of this patient, 
more than half of which was spent in care coordination at bedside for interview 
and exam.

 

 [id number]/[facility] #: [id number]

ADRIANA obstructive pulmonary disease) (CMD)  No date: Depression  No date: Hyperlipidemia  No date: Hypertension  Past Surgical History:  2005: Cervical spine surgery      Comment:  Fusion C2-C3   Prior to Admission medications :  Medication traZODone (DESYREL) 100 MG tablet, Sig Take 150 mg by mouth nightly., Start Date 3/27/24, End Date , Taking? Yes, Authorizing Provider Provider, Outside    Medication Cholecalciferol (VITAMIN D3 PO), Sig Take 1 capsule by mouth daily., Start Date , End Date , Taking? Yes, Authorizing Provider Provider, Outside    Medication albuterol 108 (90 Base) MCG/ACT inhaler, Sig Inhale 2 puffs into the lungs every 4 hours as needed for Wheezing or Shortness of Breath., Start Date 2/12/24, End Date , Taking? Yes, Authorizing Provider Provider, Outside    Medication atorvastatin (LIPITOR) 20 MG tablet, Sig Take 20 mg by mouth daily., Start Date , End Date , Taking? Yes, Authorizing Provider Provider, Outside    Medication aspirin (ECOTRIN) 81 MG EC tablet, Sig Take 1 tablet by mouth daily. Do not start before May 26, 2023., Start Date 5/26/23, End Date , Taking? Yes, Authorizing Provider Rosa Fountain MD    Medication folic acid (FOLATE) 1 MG tablet, Sig Take 1 tablet by mouth daily. Do not start before May 26, 2023., Start Date 5/26/23, End Date , Taking? Yes, Authorizing Provider Rosa Fountain MD    Medication clonazePAM (KlonoPIN) 1 MG tablet, Sig Take 0.5 mg by mouth in the morning and 0.5 mg at noon and 0.5 mg in the evening., Start Date 5/8/23, End Date , Taking? Yes, Authorizing Provider Provider, Outside    Medication buprenorphine (BUTRANS) 5 MCG/HR patch, Sig Place 1 patch onto the skin every 7 days.  Patient not taking: Reported on 4/9/2024, Start Date 2/5/24, End Date 3/6/24, Taking? , Authorizing Provider Reginald De Anda MD    Medication meloxicam (MOBIC) 15 MG tablet, Sig Take 1 tablet by mouth daily.  Patient not taking: Reported on 4/9/2024, Start Date 11/16/23, End Date  12/16/23, Taking? , Authorizing Provider Reginald De Anda MD    Medication ibuprofen (MOTRIN) 800 MG tablet, Sig Take 800 mg by mouth every 8 hours as needed. Indications: Pain, Start Date 5/27/23, End Date , Taking? , Authorizing Provider System, Provider Not In    Medication fluticasone-salmeterol 100-50 MCG/ACT inhaler, Sig Inhale 1 puff into the lungs every 12 hours.  Patient not taking: Reported on 4/9/2024, Start Date 5/25/23, End Date 5/19/24, Taking? , Authorizing Provider Rosa Fountain MD    Medication tamsulosin (FLOMAX) 0.4 MG Cap, Sig Take 1 capsule by mouth daily after a meal. Do not start before May 26, 2023.  Patient not taking: Reported on 4/9/2024, Start Date 5/26/23, End Date , Taking? , Authorizing Provider Rosa Fountain MD    Medication gabapentin (NEURONTIN) 100 MG capsule, Sig Take 3 capsules by mouth in the morning and 3 capsules at noon and 3 capsules in the evening.  Patient not taking: Reported on 4/9/2024, Start Date 5/25/23, End Date , Taking? , Authorizing Provider Rosa Fountain MD    Medication topiramate (TOPAMAX) 25 MG capsule, Sig TAKE 1 CAPSULE BY MOUTH AT BEDTIME FOR HEADACHE  Patient not taking: Reported on 4/9/2024, Start Date 3/6/23, End Date , Taking? , Authorizing Provider Provider, Outside         Patient Vitals for the past 24 hrs:   BP Temp Temp src Pulse Resp SpO2   04/12/24 0731 106/64 36.8 °C (98.2 °F) Oral (!) 106 -- 90 %   04/11/24 2346 111/61 36.4 °C (97.5 °F) Oral 79 18 93 %   04/11/24 2036 111/59 36.5 °C (97.7 °F) Oral 90 -- 96 %   04/11/24 1616 -- -- -- -- -- 95 %   04/11/24 1330 113/48 -- -- 88 18 94 %   04/11/24 1320 124/62 -- -- 87 18 94 %   04/11/24 1310 -- -- -- -- 17 96 %   04/11/24 1305 103/65 -- -- 98 17 98 %   04/11/24 1250 100/56 36.6 °C (97.9 °F) Axillary 88 16 98 %   04/11/24 1206 116/55 36.9 °C (98.4 °F) Oral 78 18 95 %       Social history reviewed:  Social History     Tobacco Use   Smoking Status Every Day    Current packs/day: 1.00     Types: Cigarettes   Smokeless Tobacco Never        E-Cigarette/Vaping Substances & Devices    E-Cigarette/Vaping Use Never Used     Nicotine No     THC No     CBD No     Flavoring No     Disposable No     Pre-filled or Refillable Cartridge No     Refillable Tank No     Pre-filled Pod No        Social History     Substance and Sexual Activity   Alcohol Use Not Currently           Relevant Problems   No relevant active problems       Physical Exam     Airway   Mallampati: II  TM Distance: >3 FB  Neck ROM: Limited  Neck: Stiffness  TMJ Mobility: Good    Cardiovascular  Cardiovascular exam normal  Cardio Rhythm: Regular  Cardio Rate: Normal    Head Assessment  Head assessment: Normocephalic and Atraumatic    General Assessment  General Assessment: Alert and oriented and No acute distress    Pulmonary Exam  Pulmonary exam normal    Abdominal Exam  Abdominal exam normal      Anesthesia Plan:    ASA Status: 3  Anesthesia Type: MAC    Induction: Intravenous  Maintenance: TIVA  Premedication: None      Post-op Pain Management: Per Surgeon      Checklist  Reviewed: NPO Status, Allergies, Medications, Problem list, Past Med History and Patient Summary  Consent/Risks Discussed Statement:  The proposed anesthetic plan, including its risks and benefits, have been discussed with the Patient along with the risks and benefits of alternatives. Questions were encouraged and answered and the patient and/or representative understands and agrees to proceed.    I have discussed elements of the patient's history or examination, as noted above and/or as follows, that place the patient at higher risk of complications; age, BMI> 30 (obesity) and pulmonary disease.    I discussed with the patient (and/or patient's legal representative) the risks and benefits of the proposed anesthesia plan, MAC, which may include services performed by other anesthesia providers.    Alternative anesthesia plans, if available, were reviewed with the patient  (and/or patient's legal representative). Discussion has been held with the patient (and/or patient's legal representative) regarding risks of anesthesia, which include Intra-operative Awareness and emergent situations that may require change in anesthesia plan.    The patient (and/or patient's legal representative) has indicated understanding, his/her questions have been answered, and he/she wishes to proceed with the planned anesthetic.    Blood Products: Not Anticipated